# Patient Record
Sex: MALE | Race: WHITE | NOT HISPANIC OR LATINO | Employment: OTHER | URBAN - METROPOLITAN AREA
[De-identification: names, ages, dates, MRNs, and addresses within clinical notes are randomized per-mention and may not be internally consistent; named-entity substitution may affect disease eponyms.]

---

## 2021-11-12 ENCOUNTER — HOSPITAL ENCOUNTER (EMERGENCY)
Facility: HOSPITAL | Age: 41
Discharge: HOME | End: 2021-11-12
Attending: EMERGENCY MEDICINE | Admitting: EMERGENCY MEDICINE

## 2021-11-12 VITALS
BODY MASS INDEX: 24.4 KG/M2 | TEMPERATURE: 98.4 F | WEIGHT: 161 LBS | HEART RATE: 77 BPM | SYSTOLIC BLOOD PRESSURE: 145 MMHG | HEIGHT: 68 IN | OXYGEN SATURATION: 97 % | RESPIRATION RATE: 16 BRPM | DIASTOLIC BLOOD PRESSURE: 101 MMHG

## 2021-11-12 DIAGNOSIS — S05.01XA ABRASION OF RIGHT CORNEA, INITIAL ENCOUNTER: Primary | ICD-10-CM

## 2021-11-12 PROCEDURE — 99283 EMERGENCY DEPT VISIT LOW MDM: CPT

## 2021-11-12 PROCEDURE — 99282 EMERGENCY DEPT VISIT SF MDM: CPT | Performed by: EMERGENCY MEDICINE

## 2021-11-12 RX ORDER — ERYTHROMYCIN 5 MG/G
OINTMENT OPHTHALMIC ONCE
Status: COMPLETED | OUTPATIENT
Start: 2021-11-12 | End: 2021-11-12

## 2021-11-12 RX ORDER — PROPARACAINE HYDROCHLORIDE 5 MG/ML
2 SOLUTION/ DROPS OPHTHALMIC ONCE
Status: COMPLETED | OUTPATIENT
Start: 2021-11-12 | End: 2021-11-12

## 2021-11-12 RX ADMIN — PROPARACAINE HYDROCHLORIDE 2 DROP: 5 SOLUTION/ DROPS OPHTHALMIC at 21:18

## 2021-11-12 RX ADMIN — ERYTHROMYCIN 1 APPLICATION: 5 OINTMENT OPHTHALMIC at 21:26

## 2021-11-12 RX ADMIN — FLUORESCEIN SODIUM 1 STRIP: 1 STRIP OPHTHALMIC at 21:18

## 2021-11-13 NOTE — ED NOTES
Pt presents to the ED after he reports he was poked in the right eye by a stick earlier today. Pt states he has flushed out his eye several times today but the irritation continues to worsen. Brie Rodriguez, RN  11/12/21 2116

## 2021-11-13 NOTE — DISCHARGE INSTRUCTIONS
Apply the prescription eye ointment every 6 hours until your eye is healed.  Please return to the emergency room for any worsening pain, swelling, redness, vision changes or any other concerns.  Follow-up in the ophthalmology clinic next week if your symptoms do not resolve back to normal  
H

## 2021-11-13 NOTE — ED PROVIDER NOTES
Subjective   History of Present Illness  History of Present Illness    Chief complaint: Eye injury    Location: Right eye    Quality/Severity: Moderate pain    Timing/Duration: Occurred around 1:30 PM today    Modifying Factors: Worse with keeping the eye open    Narrative: This patient presents for evaluation of an injury to his right eye.  He was walking around outside this afternoon when a tree branch was some kind of stick struck him right in the eyeball on the right side.  He had immediate pain at that time.  Since then he has had persistent pain with some watering of the eye.  It hurts to keep the eye open or try to look at things.  He says it feels like something stuck in the eye.    Associated Symptoms: As above    Review of Systems   Constitutional: Negative for activity change and fever.   HENT: Negative.    Eyes: Positive for photophobia, pain, discharge, redness and visual disturbance (blurry).   Respiratory: Negative for cough and shortness of breath.    Cardiovascular: Negative for chest pain.   Gastrointestinal: Negative for abdominal pain.   Skin: Negative for color change.   Neurological: Negative for syncope and headaches.   All other systems reviewed and are negative.      History reviewed. No pertinent past medical history.    No Known Allergies    History reviewed. No pertinent surgical history.    History reviewed. No pertinent family history.    Social History     Socioeconomic History   • Marital status:    Tobacco Use   • Smoking status: Never Smoker   • Smokeless tobacco: Never Used   Substance and Sexual Activity   • Alcohol use: Yes     Comment: Occasionally   • Drug use: Never       ED Triage Vitals [11/12/21 2113]   Temp Heart Rate Resp BP SpO2   98.4 °F (36.9 °C) 77 16 (!) 145/101 97 %      Temp src Heart Rate Source Patient Position BP Location FiO2 (%)   Oral Monitor Sitting Right arm --         Objective   Physical Exam  Vitals and nursing note reviewed.   Constitutional:        Appearance: He is well-developed. He is not ill-appearing, toxic-appearing or diaphoretic.   HENT:      Head: Normocephalic and atraumatic.   Eyes:      General: No scleral icterus.        Right eye: Discharge present.         Left eye: No discharge.      Extraocular Movements: Extraocular movements intact.      Pupils: Pupils are equal, round, and reactive to light.      Comments: Right eye is inflamed with thin watery discharge.  Patient has photophobia in both eyes.  There is no foreign body evident anywhere in the right eye, including underneath the eyelids.  Patient felt much better after administration of proparacaine drops.  Examination with fluorescein shows uptake of dye just medial to the pupil on the corneal surface.  Consistent with corneal abrasion injury.   Cardiovascular:      Rate and Rhythm: Normal rate and regular rhythm.   Pulmonary:      Effort: Pulmonary effort is normal. No respiratory distress.   Musculoskeletal:         General: No deformity. Normal range of motion.      Cervical back: Normal range of motion and neck supple. No rigidity.   Skin:     General: Skin is warm and dry.      Findings: No erythema or rash.   Neurological:      General: No focal deficit present.      Mental Status: He is alert and oriented to person, place, and time.   Psychiatric:         Behavior: Behavior normal.         Thought Content: Thought content normal.         Judgment: Judgment normal.         Procedures           ED Course  ED Course as of 11/12/21 2331   Fri Nov 12, 2021 2331 Patient felt tremendous relief after administration of proparacaine drops.  Fluorescein exam shows corneal abrasion.  Started patient on first dose of erythromycin ophthalmic ointment here.  Discharged home to continue antibiotic ointment per instructions.  Advised follow-up in ophthalmology clinic if not improving next week. [MASHA]      ED Course User Index  [MASHA] Ace Palomino MD                                            MDM    Final diagnoses:   Abrasion of right cornea, initial encounter       ED Disposition  ED Disposition     ED Disposition Condition Comment    Discharge Stable           Tyler Quintanilla MD  [address] [facility] [address] [phone]    Schedule an appointment as soon as possible for a visit in 1 week  As needed, If symptoms worsen         Medication List      No changes were made to your prescriptions during this visit.          Ace Palomino MD  11/12/21 6179